# Patient Record
Sex: FEMALE | Race: WHITE | Employment: FULL TIME | URBAN - METROPOLITAN AREA
[De-identification: names, ages, dates, MRNs, and addresses within clinical notes are randomized per-mention and may not be internally consistent; named-entity substitution may affect disease eponyms.]

---

## 2017-12-23 ENCOUNTER — HOSPITAL ENCOUNTER (OUTPATIENT)
Dept: MAMMOGRAPHY | Age: 52
Discharge: HOME | End: 2017-12-23
Attending: OBSTETRICS & GYNECOLOGY
Payer: COMMERCIAL

## 2017-12-23 DIAGNOSIS — Z12.39 ENCOUNTER FOR SCREENING FOR MALIGNANT NEOPLASM OF BREAST: ICD-10-CM

## 2017-12-23 DIAGNOSIS — Z12.39 SCREENING FOR MALIGNANT NEOPLASM OF BREAST: ICD-10-CM

## 2017-12-23 PROCEDURE — 77063 BREAST TOMOSYNTHESIS BI: CPT | Performed by: OBSTETRICS & GYNECOLOGY

## 2017-12-23 PROCEDURE — 77067 SCR MAMMO BI INCL CAD: CPT | Performed by: OBSTETRICS & GYNECOLOGY

## 2018-11-14 PROCEDURE — 87624 HPV HI-RISK TYP POOLED RSLT: CPT | Performed by: OBSTETRICS & GYNECOLOGY

## 2018-11-14 PROCEDURE — 88175 CYTOPATH C/V AUTO FLUID REDO: CPT | Performed by: OBSTETRICS & GYNECOLOGY

## 2018-11-23 ENCOUNTER — HOSPITAL ENCOUNTER (OUTPATIENT)
Dept: MAMMOGRAPHY | Age: 53
Discharge: HOME | End: 2018-11-23
Attending: OBSTETRICS & GYNECOLOGY
Payer: COMMERCIAL

## 2018-11-23 ENCOUNTER — HOSPITAL ENCOUNTER (OUTPATIENT)
Dept: ULTRASOUND IMAGING | Age: 53
Discharge: HOME | End: 2018-11-23
Attending: OBSTETRICS & GYNECOLOGY
Payer: COMMERCIAL

## 2018-11-23 DIAGNOSIS — N63.20 LEFT BREAST MASS: ICD-10-CM

## 2018-11-23 PROCEDURE — 77062 BREAST TOMOSYNTHESIS BI: CPT | Performed by: OBSTETRICS & GYNECOLOGY

## 2018-11-23 PROCEDURE — 77066 DX MAMMO INCL CAD BI: CPT | Performed by: OBSTETRICS & GYNECOLOGY

## 2018-11-23 PROCEDURE — 76641 ULTRASOUND BREAST COMPLETE: CPT | Performed by: OBSTETRICS & GYNECOLOGY

## 2018-11-23 PROCEDURE — 76642 ULTRASOUND BREAST LIMITED: CPT | Performed by: OBSTETRICS & GYNECOLOGY

## 2018-11-25 NOTE — PROGRESS NOTES
Radiology has informed patient of results and recommendations for follow up. [place] [facility] [place] NEED TO [place]TACT: THEY HAVE SPOKEN TO HER.  IF DONE AT ANY OTHER FACILITY, PLEASE CONTACT TO BE SURE RECEIVED RESULTS AND F/U INSTRUCTIONS  Sent

## 2020-03-17 ENCOUNTER — HOSPITAL ENCOUNTER (OUTPATIENT)
Dept: MAMMOGRAPHY | Age: 55
Discharge: HOME | End: 2020-03-17
Attending: OBSTETRICS & GYNECOLOGY
Payer: COMMERCIAL

## 2020-03-17 DIAGNOSIS — Z12.39 SCREENING FOR MALIGNANT NEOPLASM OF BREAST: ICD-10-CM

## 2020-03-17 PROCEDURE — 77063 BREAST TOMOSYNTHESIS BI: CPT | Performed by: OBSTETRICS & GYNECOLOGY

## 2020-03-17 PROCEDURE — 77067 SCR MAMMO BI INCL CAD: CPT | Performed by: OBSTETRICS & GYNECOLOGY

## 2021-02-04 PROBLEM — I10 ESSENTIAL HYPERTENSION: Status: ACTIVE | Noted: 2021-02-04

## 2021-02-18 PROBLEM — R73.09 ELEVATED GLUCOSE: Status: ACTIVE | Noted: 2021-02-18

## 2021-02-18 PROBLEM — E78.49 OTHER HYPERLIPIDEMIA: Status: ACTIVE | Noted: 2021-02-18

## 2023-05-27 ENCOUNTER — APPOINTMENT (OUTPATIENT)
Dept: MRI IMAGING | Facility: HOSPITAL | Age: 58
DRG: 065 | End: 2023-05-27
Attending: EMERGENCY MEDICINE
Payer: COMMERCIAL

## 2023-05-27 ENCOUNTER — HOSPITAL ENCOUNTER (INPATIENT)
Facility: HOSPITAL | Age: 58
LOS: 2 days | Discharge: HOME | DRG: 065 | End: 2023-05-29
Attending: EMERGENCY MEDICINE | Admitting: STUDENT IN AN ORGANIZED HEALTH CARE EDUCATION/TRAINING PROGRAM
Payer: COMMERCIAL

## 2023-05-27 ENCOUNTER — APPOINTMENT (OUTPATIENT)
Dept: CT IMAGING | Facility: HOSPITAL | Age: 58
DRG: 065 | End: 2023-05-27
Attending: EMERGENCY MEDICINE
Payer: COMMERCIAL

## 2023-05-27 DIAGNOSIS — E86.0 DEHYDRATION: Primary | ICD-10-CM

## 2023-05-27 DIAGNOSIS — E87.6 HYPOKALEMIA: ICD-10-CM

## 2023-05-27 DIAGNOSIS — Z86.73 CEREBELLAR CEREBROVASCULAR ACCIDENT (CVA) WITHOUT LATE EFFECT: ICD-10-CM

## 2023-05-27 PROBLEM — E87.20 METABOLIC ACIDOSIS: Status: ACTIVE | Noted: 2023-05-27

## 2023-05-27 PROBLEM — R73.9 HYPERGLYCEMIA: Status: ACTIVE | Noted: 2023-05-27

## 2023-05-27 PROBLEM — I63.9 CVA (CEREBRAL VASCULAR ACCIDENT) (HCC): Status: ACTIVE | Noted: 2023-05-27

## 2023-05-27 LAB
ALBUMIN SERPL-MCNC: 3.8 G/DL (ref 3.4–5)
ALBUMIN/GLOB SERPL: 1 {RATIO} (ref 1–2)
ALP LIVER SERPL-CCNC: 90 U/L
ALT SERPL-CCNC: 22 U/L
ANION GAP SERPL CALC-SCNC: 15 MMOL/L (ref 0–18)
AST SERPL-CCNC: 22 U/L (ref 15–37)
BASOPHILS # BLD AUTO: 0.08 X10(3) UL (ref 0–0.2)
BASOPHILS NFR BLD AUTO: 0.7 %
BILIRUB SERPL-MCNC: 0.7 MG/DL (ref 0.1–2)
BILIRUB UR QL STRIP.AUTO: NEGATIVE
BUN BLD-MCNC: 21 MG/DL (ref 7–18)
CALCIUM BLD-MCNC: 9.4 MG/DL (ref 8.5–10.1)
CHLORIDE SERPL-SCNC: 103 MMOL/L (ref 98–112)
CLARITY UR REFRACT.AUTO: CLEAR
CO2 SERPL-SCNC: 18 MMOL/L (ref 21–32)
COLOR UR AUTO: YELLOW
CREAT BLD-MCNC: 1.11 MG/DL
EOSINOPHIL # BLD AUTO: 0.18 X10(3) UL (ref 0–0.7)
EOSINOPHIL NFR BLD AUTO: 1.7 %
ERYTHROCYTE [DISTWIDTH] IN BLOOD BY AUTOMATED COUNT: 11.6 %
GFR SERPLBLD BASED ON 1.73 SQ M-ARVRAT: 57 ML/MIN/1.73M2 (ref 60–?)
GLOBULIN PLAS-MCNC: 3.9 G/DL (ref 2.8–4.4)
GLUCOSE BLD-MCNC: 153 MG/DL (ref 70–99)
GLUCOSE UR STRIP.AUTO-MCNC: NEGATIVE MG/DL
HCT VFR BLD AUTO: 41.2 %
HGB BLD-MCNC: 14.6 G/DL
IMM GRANULOCYTES # BLD AUTO: 0.13 X10(3) UL (ref 0–1)
IMM GRANULOCYTES NFR BLD: 1.2 %
KETONES UR STRIP.AUTO-MCNC: 20 MG/DL
LEUKOCYTE ESTERASE UR QL STRIP.AUTO: NEGATIVE
LIPASE SERPL-CCNC: 26 U/L (ref 13–75)
LYMPHOCYTES # BLD AUTO: 3.93 X10(3) UL (ref 1–4)
LYMPHOCYTES NFR BLD AUTO: 36.4 %
MAGNESIUM SERPL-MCNC: 1.8 MG/DL (ref 1.6–2.6)
MCH RBC QN AUTO: 32.7 PG (ref 26–34)
MCHC RBC AUTO-ENTMCNC: 35.4 G/DL (ref 31–37)
MCV RBC AUTO: 92.2 FL
MONOCYTES # BLD AUTO: 0.78 X10(3) UL (ref 0.1–1)
MONOCYTES NFR BLD AUTO: 7.2 %
NEUTROPHILS # BLD AUTO: 5.7 X10 (3) UL (ref 1.5–7.7)
NEUTROPHILS # BLD AUTO: 5.7 X10(3) UL (ref 1.5–7.7)
NEUTROPHILS NFR BLD AUTO: 52.8 %
NITRITE UR QL STRIP.AUTO: NEGATIVE
OSMOLALITY SERPL CALC.SUM OF ELEC: 288 MOSM/KG (ref 275–295)
PH UR STRIP.AUTO: 7 [PH] (ref 5–8)
PLATELET # BLD AUTO: 272 10(3)UL (ref 150–450)
POTASSIUM SERPL-SCNC: 2.8 MMOL/L (ref 3.5–5.1)
PROT SERPL-MCNC: 7.7 G/DL (ref 6.4–8.2)
PROT UR STRIP.AUTO-MCNC: NEGATIVE MG/DL
RBC # BLD AUTO: 4.47 X10(6)UL
RBC UR QL AUTO: NEGATIVE
SODIUM SERPL-SCNC: 136 MMOL/L (ref 136–145)
SP GR UR STRIP.AUTO: 1.01 (ref 1–1.03)
UROBILINOGEN UR STRIP.AUTO-MCNC: 2 MG/DL
WBC # BLD AUTO: 10.8 X10(3) UL (ref 4–11)

## 2023-05-27 PROCEDURE — 70450 CT HEAD/BRAIN W/O DYE: CPT | Performed by: EMERGENCY MEDICINE

## 2023-05-27 PROCEDURE — 70553 MRI BRAIN STEM W/O & W/DYE: CPT | Performed by: EMERGENCY MEDICINE

## 2023-05-27 PROCEDURE — 70498 CT ANGIOGRAPHY NECK: CPT | Performed by: EMERGENCY MEDICINE

## 2023-05-27 PROCEDURE — 70496 CT ANGIOGRAPHY HEAD: CPT | Performed by: EMERGENCY MEDICINE

## 2023-05-27 PROCEDURE — 0042T CT STROKE(DAWN BRAIN)CTA BRAIN/CTA NECK+PERF(CPT=70496/70498/0042T): CPT | Performed by: EMERGENCY MEDICINE

## 2023-05-27 RX ORDER — POTASSIUM CHLORIDE 20 MEQ/1
40 TABLET, EXTENDED RELEASE ORAL ONCE
Status: COMPLETED | OUTPATIENT
Start: 2023-05-27 | End: 2023-05-27

## 2023-05-27 RX ORDER — ONDANSETRON 2 MG/ML
4 INJECTION INTRAMUSCULAR; INTRAVENOUS EVERY 6 HOURS PRN
Status: DISCONTINUED | OUTPATIENT
Start: 2023-05-27 | End: 2023-05-29

## 2023-05-27 RX ORDER — HEPARIN SODIUM 5000 [USP'U]/ML
5000 INJECTION, SOLUTION INTRAVENOUS; SUBCUTANEOUS EVERY 8 HOURS SCHEDULED
Status: DISCONTINUED | OUTPATIENT
Start: 2023-05-28 | End: 2023-05-29

## 2023-05-27 RX ORDER — ALBUTEROL SULFATE 90 UG/1
2 AEROSOL, METERED RESPIRATORY (INHALATION) EVERY 6 HOURS PRN
Status: DISCONTINUED | OUTPATIENT
Start: 2023-05-27 | End: 2023-05-29

## 2023-05-27 RX ORDER — GADOTERATE MEGLUMINE 376.9 MG/ML
20 INJECTION INTRAVENOUS
Status: COMPLETED | OUTPATIENT
Start: 2023-05-27 | End: 2023-05-27

## 2023-05-27 RX ORDER — ACETAMINOPHEN 325 MG/1
650 TABLET ORAL EVERY 4 HOURS PRN
Status: DISCONTINUED | OUTPATIENT
Start: 2023-05-27 | End: 2023-05-29

## 2023-05-27 RX ORDER — DIPHENHYDRAMINE HYDROCHLORIDE 50 MG/ML
25 INJECTION INTRAMUSCULAR; INTRAVENOUS ONCE
Status: COMPLETED | OUTPATIENT
Start: 2023-05-27 | End: 2023-05-27

## 2023-05-27 RX ORDER — ASPIRIN 81 MG/1
324 TABLET, CHEWABLE ORAL ONCE
Status: COMPLETED | OUTPATIENT
Start: 2023-05-27 | End: 2023-05-27

## 2023-05-27 RX ORDER — ATORVASTATIN CALCIUM 40 MG/1
40 TABLET, FILM COATED ORAL NIGHTLY
Status: DISCONTINUED | OUTPATIENT
Start: 2023-05-28 | End: 2023-05-28

## 2023-05-27 RX ORDER — ONDANSETRON 2 MG/ML
4 INJECTION INTRAMUSCULAR; INTRAVENOUS ONCE
Status: COMPLETED | OUTPATIENT
Start: 2023-05-27 | End: 2023-05-27

## 2023-05-27 RX ORDER — HYDRALAZINE HYDROCHLORIDE 20 MG/ML
10 INJECTION INTRAMUSCULAR; INTRAVENOUS EVERY 2 HOUR PRN
Status: DISCONTINUED | OUTPATIENT
Start: 2023-05-27 | End: 2023-05-29

## 2023-05-27 RX ORDER — POTASSIUM CHLORIDE 14.9 MG/ML
20 INJECTION INTRAVENOUS ONCE
Status: COMPLETED | OUTPATIENT
Start: 2023-05-27 | End: 2023-05-27

## 2023-05-27 RX ORDER — SODIUM CHLORIDE 9 MG/ML
INJECTION, SOLUTION INTRAVENOUS CONTINUOUS
Status: DISCONTINUED | OUTPATIENT
Start: 2023-05-28 | End: 2023-05-29

## 2023-05-27 RX ORDER — METOCLOPRAMIDE HYDROCHLORIDE 5 MG/ML
10 INJECTION INTRAMUSCULAR; INTRAVENOUS ONCE
Status: COMPLETED | OUTPATIENT
Start: 2023-05-27 | End: 2023-05-27

## 2023-05-27 RX ORDER — ACETAMINOPHEN 650 MG/1
650 SUPPOSITORY RECTAL EVERY 4 HOURS PRN
Status: DISCONTINUED | OUTPATIENT
Start: 2023-05-27 | End: 2023-05-29

## 2023-05-27 RX ORDER — PROCHLORPERAZINE EDISYLATE 5 MG/ML
5 INJECTION INTRAMUSCULAR; INTRAVENOUS EVERY 8 HOURS PRN
Status: DISCONTINUED | OUTPATIENT
Start: 2023-05-27 | End: 2023-05-29

## 2023-05-27 RX ORDER — DIAZEPAM 5 MG/ML
5 INJECTION, SOLUTION INTRAMUSCULAR; INTRAVENOUS ONCE
Status: COMPLETED | OUTPATIENT
Start: 2023-05-27 | End: 2023-05-27

## 2023-05-27 RX ORDER — VALACYCLOVIR HYDROCHLORIDE 500 MG/1
500 TABLET, FILM COATED ORAL EVERY 12 HOURS SCHEDULED
Status: DISCONTINUED | OUTPATIENT
Start: 2023-05-28 | End: 2023-05-29

## 2023-05-27 RX ORDER — LABETALOL HYDROCHLORIDE 5 MG/ML
10 INJECTION, SOLUTION INTRAVENOUS EVERY 10 MIN PRN
Status: DISCONTINUED | OUTPATIENT
Start: 2023-05-27 | End: 2023-05-29

## 2023-05-27 NOTE — ED INITIAL ASSESSMENT (HPI)
Pt brought in by EMS from home with c/o of vertigo for the past 30 minutes. +N/V. Pt with hx of vertigo, states \"this feels worse than usual.\" Pt taking weight loss medications.

## 2023-05-28 ENCOUNTER — APPOINTMENT (OUTPATIENT)
Dept: CV DIAGNOSTICS | Facility: HOSPITAL | Age: 58
DRG: 065 | End: 2023-05-28
Attending: STUDENT IN AN ORGANIZED HEALTH CARE EDUCATION/TRAINING PROGRAM
Payer: COMMERCIAL

## 2023-05-28 LAB
ANION GAP SERPL CALC-SCNC: 5 MMOL/L (ref 0–18)
BUN BLD-MCNC: 14 MG/DL (ref 7–18)
CALCIUM BLD-MCNC: 8.4 MG/DL (ref 8.5–10.1)
CHLORIDE SERPL-SCNC: 105 MMOL/L (ref 98–112)
CHOLEST SERPL-MCNC: 137 MG/DL (ref ?–200)
CO2 SERPL-SCNC: 25 MMOL/L (ref 21–32)
CREAT BLD-MCNC: 0.69 MG/DL
ERYTHROCYTE [DISTWIDTH] IN BLOOD BY AUTOMATED COUNT: 11.7 %
EST. AVERAGE GLUCOSE BLD GHB EST-MCNC: 114 MG/DL (ref 68–126)
GFR SERPLBLD BASED ON 1.73 SQ M-ARVRAT: 100 ML/MIN/1.73M2 (ref 60–?)
GLUCOSE BLD-MCNC: 114 MG/DL (ref 70–99)
GLUCOSE BLD-MCNC: 117 MG/DL (ref 70–99)
GLUCOSE BLD-MCNC: 135 MG/DL (ref 70–99)
GLUCOSE BLD-MCNC: 136 MG/DL (ref 70–99)
GLUCOSE BLD-MCNC: 158 MG/DL (ref 70–99)
HBA1C MFR BLD: 5.6 % (ref ?–5.7)
HCT VFR BLD AUTO: 37.7 %
HDLC SERPL-MCNC: 54 MG/DL (ref 40–59)
HGB BLD-MCNC: 13.1 G/DL
LDLC SERPL CALC-MCNC: 67 MG/DL (ref ?–100)
MCH RBC QN AUTO: 32.5 PG (ref 26–34)
MCHC RBC AUTO-ENTMCNC: 34.7 G/DL (ref 31–37)
MCV RBC AUTO: 93.5 FL
NONHDLC SERPL-MCNC: 83 MG/DL (ref ?–130)
OSMOLALITY SERPL CALC.SUM OF ELEC: 281 MOSM/KG (ref 275–295)
PLATELET # BLD AUTO: 227 10(3)UL (ref 150–450)
POTASSIUM SERPL-SCNC: 4.4 MMOL/L (ref 3.5–5.1)
RBC # BLD AUTO: 4.03 X10(6)UL
SODIUM SERPL-SCNC: 135 MMOL/L (ref 136–145)
TRIGL SERPL-MCNC: 86 MG/DL (ref 30–149)
VLDLC SERPL CALC-MCNC: 13 MG/DL (ref 0–30)
WBC # BLD AUTO: 11.5 X10(3) UL (ref 4–11)

## 2023-05-28 PROCEDURE — 93306 TTE W/DOPPLER COMPLETE: CPT | Performed by: STUDENT IN AN ORGANIZED HEALTH CARE EDUCATION/TRAINING PROGRAM

## 2023-05-28 PROCEDURE — 99223 1ST HOSP IP/OBS HIGH 75: CPT | Performed by: OTHER

## 2023-05-28 RX ORDER — CLOPIDOGREL BISULFATE 75 MG/1
75 TABLET ORAL DAILY
Status: DISCONTINUED | OUTPATIENT
Start: 2023-05-29 | End: 2023-05-29

## 2023-05-28 RX ORDER — ATORVASTATIN CALCIUM 20 MG/1
20 TABLET, FILM COATED ORAL NIGHTLY
Qty: 90 TABLET | Refills: 1 | Status: SHIPPED | OUTPATIENT
Start: 2023-05-28

## 2023-05-28 RX ORDER — ASPIRIN 81 MG/1
81 TABLET ORAL DAILY
Status: DISCONTINUED | OUTPATIENT
Start: 2023-05-29 | End: 2023-05-29

## 2023-05-28 RX ORDER — ATORVASTATIN CALCIUM 20 MG/1
20 TABLET, FILM COATED ORAL NIGHTLY
Status: DISCONTINUED | OUTPATIENT
Start: 2023-05-28 | End: 2023-05-29

## 2023-05-28 RX ORDER — CLOPIDOGREL BISULFATE 75 MG/1
75 TABLET ORAL DAILY
Qty: 20 TABLET | Refills: 0 | Status: SHIPPED | OUTPATIENT
Start: 2023-05-29

## 2023-05-28 RX ORDER — MAGNESIUM OXIDE 400 MG/1
400 TABLET ORAL ONCE
Status: COMPLETED | OUTPATIENT
Start: 2023-05-28 | End: 2023-05-28

## 2023-05-28 NOTE — PROGRESS NOTES
NURSING ADMISSION NOTE      Patient admitted via Cart  Oriented to room. Safety precautions initiated. Bed in low position. Call light in reach. A/Ox4, on room air, NSR on tele. Q2 Neuro assessment. NIH daily. Vital signs Q2. VTE prophylaxis with heparin. PASSed RN dysphagia screen. Advanced to cardiac diet. IV to right AC infusing 0.9 nacl at 75 ml/hr. No complaints of pain. Patient updated with plan of care. All needs met at this time.

## 2023-05-28 NOTE — PLAN OF CARE
Assumed care at 0730  Orientated x4, NSR, RA   Denies pain    Nq4; no new neuro deficits   BP within parameters   Per patient, \"vertigo and nausea has improved\"   PT/OT and echo completed  Needs met, call light within reach     Plan for further stroke workup; asa/plavix added

## 2023-05-28 NOTE — ED QUICK NOTES
Orders for admission, patient is aware of plan and ready to go upstairs. Any questions, please call ED RN Moon Tillman at extension [phone].      Patient Covid vaccination status: Fully vaccinated     COVID Test Ordered in ED: None    COVID Suspicion at Admission: N/A    Running Infusions:  None    Mental Status/LOC at time of transport: AO x 4    Other pertinent information:   CIWA score: N/A   NIH score:  0

## 2023-05-28 NOTE — ED QUICK NOTES
Pt vomiting, unable to complete NIH at this time. Pt unable to move her extremities at this time. Pt shaking. Pt speaking in complete/clear sentences. Pt states \"she feels dizzy and cold. \" AO x 4.

## 2023-05-28 NOTE — PROGRESS NOTES
Stroke Navigator Note:    2000: Called to C9/C9 to evaluate pt.    2003: arrived to pt cart side    Pt developed dizziness at 3:45 pm. Per pt she has history of vertigo which is like \"room spinning\" but this is like \" waves\"  Then nausea ,vomiting and diarrhea; then she reportedly got cold and diaphoretic; laid down on the floor. She was holding on the wall to walk    PMHx including DM2, HTN, and Hyperlipidemia    Pt had MRI which showed multiple cerebellar stroke    Upon arrival found patient with NIH of 0  Left leg heel to shin was slower compared to R leg. Last Known Normal at 1530  Patient does have contraindications for TNK - out of window    2015: Code stroke activated. Discussed with Dr. Ismael Malcolm patient to CT scanner for CT/CTA/CTP  2022: Discussed case with Dr. Suzy Rodarte  2048: per Dr. Suzy Rodarte pt is not a candidate for praveena intervention based on imaging result. Discussed case with Dr. Jarrett Crook MD, bedside RN, pt and spouse.     Pt to be admitted on the floor

## 2023-05-29 VITALS
WEIGHT: 176 LBS | OXYGEN SATURATION: 100 % | RESPIRATION RATE: 16 BRPM | DIASTOLIC BLOOD PRESSURE: 87 MMHG | BODY MASS INDEX: 30.05 KG/M2 | HEART RATE: 82 BPM | TEMPERATURE: 99 F | SYSTOLIC BLOOD PRESSURE: 140 MMHG | HEIGHT: 64 IN

## 2023-05-29 LAB
GLUCOSE BLD-MCNC: 96 MG/DL (ref 70–99)
MAGNESIUM SERPL-MCNC: 2.1 MG/DL (ref 1.6–2.6)

## 2023-05-29 RX ORDER — ASPIRIN 325 MG
325 TABLET ORAL DAILY
Qty: 30 TABLET | Refills: 1 | Status: SHIPPED | OUTPATIENT
Start: 2023-06-20

## 2023-05-29 RX ORDER — ASPIRIN 81 MG/1
81 TABLET ORAL DAILY
Qty: 20 TABLET | Refills: 0 | Status: SHIPPED | OUTPATIENT
Start: 2023-05-30 | End: 2023-06-19

## 2023-05-29 NOTE — PLAN OF CARE
NURSING DISCHARGE NOTE    Discharged Home via Ambulatory. Accompanied by Family member and Support staff  Belongings Taken by patient/family. Pt AxO4  RA  NSR  No pain at this time  1x assist/standby  -OT c/s    Neurochecks  -deficits noted  NIH daily  IV fluids  Monitor/manage BG level  Monitor/manage BP level  Pt informed of POC, all questions answered  -pt family informed of POC    Pt given and informed of discharge instrucitons, new medications, change medications, home medications, and f/u dates. Pt given further education handouts related to admission. Pt and family verbally understand discharge instructions, all questions answered.     Problem: Diabetes/Glucose Control  Goal: Glucose maintained within prescribed range  Description: INTERVENTIONS:  - Monitor Blood Glucose as ordered  - Assess for signs and symptoms of hyperglycemia and hypoglycemia  - Administer ordered medications to maintain glucose within target range  - Assess barriers to adequate nutritional intake and initiate nutrition consult as needed  - Instruct patient on self management of diabetes  5/29/2023 1141 by Cookie Martinez RN  Outcome: Adequate for Discharge  5/29/2023 1140 by Cookie Martinez RN  Outcome: Adequate for Discharge     Problem: Patient/Family Goals  Goal: Patient/Family Long Term Goal  Description: Patient's Long Term Goal: discharge in stable condition    Interventions:  - pain control  - stabilize blood pressure  - PT/OT/SLP  - See additional Care Plan goals for specific interventions  5/29/2023 1141 by Cookie Martinez RN  Outcome: Adequate for Discharge  5/29/2023 1140 by Cookie Martinez RN  Outcome: Adequate for Discharge  Goal: Patient/Family Short Term Goal  Description: Patient's Short Term Goal: pain control      Interventions:   - follow medications as ordered  - PRNs as needed  - See additional Care Plan goals for specific interventions  5/29/2023 1141 by Cookie Martinez RN  Outcome: Adequate for Discharge  5/29/2023 1140 by Cookie Martinez RN  Outcome: Adequate for Discharge     Problem: NEUROLOGICAL - ADULT  Goal: Achieves stable or improved neurological status  Description: INTERVENTIONS  - Assess for and report changes in neurological status  - Initiate measures to prevent increased intracranial pressure  - Maintain blood pressure and fluid volume within ordered parameters to optimize cerebral perfusion and minimize risk of hemorrhage  - Monitor temperature, glucose, and sodium.  Initiate appropriate interventions as ordered  --150  -PT/OT/SLP eval  5/29/2023 1141 by Cookie Martinez RN  Outcome: Adequate for Discharge  5/29/2023 1140 by Cookie Martinez RN  Outcome: Adequate for Discharge  Goal: Achieves maximal functionality and self care  Description: INTERVENTIONS  - Monitor swallowing and airway patency with patient fatigue and changes in neurological status  - Encourage and assist patient to increase activity and self care with guidance from PT/OT  - Encourage visually impaired, hearing impaired and aphasic patients to use assistive/communication devices  -PT/OT/SLP eval  5/29/2023 1141 by Cookie Martinez RN  Outcome: Adequate for Discharge  5/29/2023 1140 by Cookie Martinez RN  Outcome: Adequate for Discharge

## 2023-05-30 ENCOUNTER — TELEPHONE (OUTPATIENT)
Dept: SURGERY | Facility: CLINIC | Age: 58
End: 2023-05-30

## 2023-05-30 LAB
APTT PPP: 28.7 SECONDS (ref 23.3–35.6)
INR BLD: 0.98 (ref 0.85–1.16)
LA 3 SCREEN W REFLEX-IMP: NEGATIVE
PROTHROMBIN TIME: 13 SECONDS (ref 11.6–14.8)
SCREEN DRVVT: 1.01 S (ref 0–1.29)
SCREEN DRVVT: NEGATIVE S
STACLOT LA DELTA: 2.7 SECONDS (ref ?–8)

## 2023-05-30 NOTE — TELEPHONE ENCOUNTER
Pt called and stated will be on vacation 6/8/23 to 7/3/23 pt was scheduled for stroke fu on 7/5/23 for 11:40am Detail Level: Zone Detail Level: Detailed

## 2023-05-30 NOTE — TELEPHONE ENCOUNTER
Pt saw provider in [place] [id number] 5/27-5/29. She states Provider wants to see her in 30 days and 60 days for a stroke follow-up. Pt scheduled for 7/27/23. She will be out of town the week of July 4th. Please advise, Pt's best call back number is [phone]. Endorsed to RN for Provider.

## 2023-05-31 LAB
ANTITHROMBIN ACTIVITY: 99 %
B2 GLYCOPROT1 IGG SERPL IA-ACNC: <0.8 U/ML (ref ?–7)
B2 GLYCOPROT1 IGM SERPL IA-ACNC: <2.4 U/ML (ref ?–7)
CARDIOLIPIN IGG SERPL-ACNC: <0.5 GPL (ref ?–10)
CARDIOLIPIN IGM SERPL-ACNC: 3.8 MPL (ref ?–10)
FACTOR VIII ACT: 155 %
PROTEIN C FUNCTIONAL: 125 %
PROTEIN S FUNCT: 97 %

## 2023-07-05 ENCOUNTER — OFFICE VISIT (OUTPATIENT)
Dept: NEUROLOGY | Facility: CLINIC | Age: 58
End: 2023-07-05
Payer: COMMERCIAL

## 2023-07-05 VITALS
WEIGHT: 171.31 LBS | SYSTOLIC BLOOD PRESSURE: 121 MMHG | DIASTOLIC BLOOD PRESSURE: 62 MMHG | BODY MASS INDEX: 29 KG/M2 | HEART RATE: 78 BPM | RESPIRATION RATE: 15 BRPM

## 2023-07-05 DIAGNOSIS — Z86.73 HISTORY OF STROKE: Primary | ICD-10-CM

## 2023-07-05 PROCEDURE — 99213 OFFICE O/P EST LOW 20 MIN: CPT | Performed by: OTHER

## 2023-07-05 PROCEDURE — 3078F DIAST BP <80 MM HG: CPT | Performed by: OTHER

## 2023-07-05 PROCEDURE — 3074F SYST BP LT 130 MM HG: CPT | Performed by: OTHER

## 2023-07-05 RX ORDER — METFORMIN HYDROCHLORIDE 500 MG/1
1000 TABLET, EXTENDED RELEASE ORAL 2 TIMES DAILY WITH MEALS
COMMUNITY
Start: 2023-05-24

## 2023-07-05 NOTE — PROGRESS NOTES
Neurology H&P    Gabriela Briscoe Patient Status:  No patient class for patient encounter    1965 MRN [id number]   Location [facility] Attending No att. providers found   Hosp Day # 0 PCP Zully Hopper MD     Subjective:  Initial Hospital HPI 23  Gabriela Briscoe is a(n) 59year old female with a PMH significant for obestiy, HTN, HL and who presented to the ED with acute onset vertigo. She had a CTH/CTA and MRI in the ED and was found to have a larger R cerebelalr stroke. She states that she was juicing a lemon for a cake and then became very light headed. Not necessarily like a vertigo sensation but almost pulsing. She went to [employer] and eventually started vomiting. She states that since arriving [facility] ED she has felt fine. She reports no numbness, weakness or tingling. No vertigo or light headedness or headaches at this time. No previous h/o stroke and she does not take any antiplatelet at home. Interim History:  Pt as last seen in the hospital following her cerebellar stroke. She comes back to the clinic today for follow up. She has not had any new stroke like symptoms. She is taking aspirin and atorvastatin daily. Current Medications:  Current Outpatient Medications   Medication Sig Dispense Refill    metFORMIN  MG Oral Tablet 24 Hr Take 2 tablets (1,000 mg total) by mouth 2 (two) times daily with meals. aspirin 325 MG Oral Tab Take 1 tablet (325 mg total) by mouth daily. 30 tablet 1    atorvastatin 20 MG Oral Tab Take 1 tablet (20 mg total) by mouth nightly. 90 tablet 1    Albuterol Sulfate  (90 Base) MCG/ACT Inhalation Aero Soln Inhale 2 puffs into the lungs every 6 (six) hours as needed.  6.7 g 1    HYDROCHLOROTHIAZIDE 12.5 MG Oral Cap TAKE 1 CAPSULE BY MOUTH EVERY DAY 90 capsule 0    Tolterodine Tartrate ER 4 MG Oral Capsule SR 24 Hr TAKE 1 CAPSULE(4 MG) BY MOUTH DAILY 90 capsule 1    valACYclovir 500 MG Oral Tab TAKE 1 TABLET(500 MG) BY MOUTH TWICE DAILY AS NEEDED 60 tablet 5       Problem List:  Patient Active Problem List:     Obesity     S/P endometrial ablation     Essential hypertension     Other hyperlipidemia     Elevated glucose     Metabolic acidosis     Hyperglycemia     Dehydration     Hypokalemia     Cerebellar cerebrovascular accident (CVA) without late effect     CVA (cerebral vascular accident) ([facility] [address]      PMHx:  Past Medical History:   Diagnosis Date    Elevated glucose     Essential hypertension     Hyperlipidemia     Obesity        PSHx:  Past Surgical History:   Procedure Laterality Date      ,2004    x2    COLONOSCOPY,DIAGNOSTIC N/A 2016    diverticulosis    OTHER SURGICAL HISTORY      s/p endometrial ablation    TUBAL LIGATION         SocHx:  Social History     Socioeconomic History    Marital status:     Number of children: 2   Occupational History    Occupation: Manager at [employer]    Smoking status: Never    Smokeless tobacco: Never   Vaping Use    Vaping Use: Never used   Substance and Sexual Activity    Alcohol use: Yes     Alcohol/week: 2.5 - 3.3 standard drinks of alcohol     Types: 3 - 4 Cans of beer per week     Comment: 1-2 drinks a week     Drug use: No    Sexual activity: Yes     Partners: Male     Birth control/protection: Surgical       Family History:  Family History   Problem Relation Age of Onset    Cancer Father         liver    Ulcerative Colitis Father     High Cholesterol Father     Uterine Cancer Mother     Other (Osteoporosis) Mother 72           ROS:  10 point ROS completed and was negative, except for pertinent positive and negatives stated in subjective. Objective/Physical Exam:    Vital Signs:  Weight 171 lb 4.8 oz (77.7 kg), not currently breastfeeding.     Gen: Awake and in no apparent distress  HEENT: moist mucus membranes  Neck: Supple  Cardiovascular: Regular rate and rhythm, no murmur  Pulm: CTAB  GI: non-tender, normal bowel sounds  Skin: normal, dry  Extremities: No clubbing or cyanosis      Neurologic:   MENTAL STATUS: alert, ox3, normal attention, language and fund of knowledge. CRANIAL NERVES II to XII: PERRLA, no ptosis or diplopia, EOM intact, facial sensation intact, strong eye closure, face is symmetric, no dysarthria, tongue midline,  no tongue fasciculations or atrophy, strong shoulder shrug. MOTOR EXAMINATION: normal tone, no fasciculations, normal strength throughout in UEs and LEs except. SENSORY EXAMINATION:  UE: intact to light touch, pinprick intact  LE: intact to light touch, pinprick intact    COORDINATION:  No dysmetria, or intention tremors; normal ROSITA    REFLEXES: 2+ at biceps, 2+ brachioradialis, 2+ at patella, 2+ at the ankles     GAIT: normal stance, normal toe gait and heel gait, tandem well. Labs:       Imaging:  MRI Brain 5/2023  Impression   CONCLUSION:         Multiple acute infarcts that are in moderate to large in size in the right cerebellar hemisphere are noted. These are mostly in the posterior inferior cerebellar artery territory. CTA/CTP  Narrative      FINDINGS:    After administration of contrast perfusion data was acquired at the [place] [address]. The data was sent to a separate workstation were appropriate  measures were performed. The perfusion images do not extend throughout the entire cerebellar  hemispheres. This is a nondiagnostic examination. The upper cervical, petrous, cavernous, and supraclinoid internal carotid arteries are unremarkable. An anterior communicating artery is seen. The branches of the anterior cerebral and middle cerebral arteries are unremarkable. A small right posterior   communicating artery is seen along with a small left posterior communicating artery. The branches of the posterior cerebral and superior cerebellar arteries are unremarkable. The basilar artery has a normal course and caliber.   Hypoplastic right V4  segment terminates as the right posterior inferior cerebellar artery. Left posterior inferior cerebellar arteries patent as well. There is a 3-vessel aortic arch. The origins of the branch vessels appear widely patent. The bilateral subclavian arteries and innominate artery are unremarkable. The common carotid arteries are widely patent. The carotid bifurcations appear unremarkable. There is no evidence of hemodynamically significant stenosis in the carotid bulbs by NASCET criteria. The cervical internal carotid arteries are widely  patent. The vertebral arteries originate from the subclavian arteries. The origins of the vertebral arteries are patent. The cervical vertebral arteries are widely patent. The left vertebral artery is mildly dominant. Impression   CONCLUSION:       1. Hypoplastic right vertebral artery is noted. This terminates as the right posterior inferior cerebellar artery. Bilateral tibial arteries are patent. The left humeral artery is markedly dominant. Bilateral posterior inferior cerebellar artery is  appear to be patent as well. 2. Major arterial structures otherwise in the head and neck are widely patent. 3. The perfusion examination is not demonstrate any asymmetry or ischemia. However, this is nondiagnostic for evaluation of the cerebellum. This does not extend caudally enough to evaluate the region of the PICA. TTE w/ bubble  Conclusions:     1. Left ventricle: The cavity size was normal. Wall thickness was normal.      Systolic function was normal. The estimated ejection fraction was 60-65%. Features are consistent with a pseudonormal left ventricular filling      pattern, with concomitant abnormal relaxation and increased filling      pressure - grade 2 diastolic dysfunction.    2. Right ventricle: Systolic function was normal.   3. Left atrium: The left atrial volume was normal.   4. Atrial septum: Agitated saline contrast study showed no atrial level      shunt. Assessment: This is a 58 y/o female with a previous R cerebellar stroke. Her hypercoagulable panel was normal with the exception of a mildly elevated Factor VIII, I can repeat this test and if still abnormal will refer her to hematology.  She can continue aspirin and atorvastatin at this time      Plan:  R Cerebellar stroke  - Repeat factor VIII  - Continue aspirin and atorvastatin      Hero Jacobo, DO  Neurology

## 2023-07-05 NOTE — PROGRESS NOTES
Pt seen IP for stroke by Dr. Nae Oseguera. Pt reports when she is hot and fatigued she gets a heaviness on left side, denies any new stroke symptoms. Pt reports an \"uncontrolled\" tremor of left 4th finger since stroke.

## 2023-07-06 ENCOUNTER — LAB ENCOUNTER (OUTPATIENT)
Dept: LAB | Age: 58
End: 2023-07-06
Attending: Other
Payer: COMMERCIAL

## 2023-07-06 DIAGNOSIS — Z86.73 HISTORY OF STROKE: ICD-10-CM

## 2023-07-06 PROCEDURE — 85240 CLOT FACTOR VIII AHG 1 STAGE: CPT

## 2023-07-06 PROCEDURE — 36415 COLL VENOUS BLD VENIPUNCTURE: CPT

## 2023-07-08 LAB — FACTOR VIII ACT: 136 %

## 2023-08-27 ENCOUNTER — TELEPHONE (OUTPATIENT)
Dept: MEDSURG UNIT | Facility: HOSPITAL | Age: 58
End: 2023-08-27

## 2023-11-14 ENCOUNTER — OFFICE VISIT (OUTPATIENT)
Dept: NEUROLOGY | Facility: CLINIC | Age: 58
End: 2023-11-14
Payer: COMMERCIAL

## 2023-11-14 VITALS
RESPIRATION RATE: 16 BRPM | HEART RATE: 85 BPM | WEIGHT: 167.13 LBS | BODY MASS INDEX: 29 KG/M2 | SYSTOLIC BLOOD PRESSURE: 123 MMHG | DIASTOLIC BLOOD PRESSURE: 77 MMHG

## 2023-11-14 DIAGNOSIS — Z86.73 HISTORY OF STROKE: Primary | ICD-10-CM

## 2023-11-14 PROCEDURE — 3078F DIAST BP <80 MM HG: CPT | Performed by: OTHER

## 2023-11-14 PROCEDURE — 3074F SYST BP LT 130 MM HG: CPT | Performed by: OTHER

## 2023-11-14 PROCEDURE — 99213 OFFICE O/P EST LOW 20 MIN: CPT | Performed by: OTHER

## 2023-11-14 NOTE — PROGRESS NOTES
Neurology H&P    Sonia Leightonrobles Decynthiaconrad Patient Status:  No patient class for patient encounter    1965 MRN [id number]   Location [facility], [facility] Attending No att. providers found   Hosp Day # 0 PCP Cm Cagle MD     Subjective:  Initial Hospital HPI 23  Chelsea Cheung is a(n) 59year old female with a PMH significant for obestiy, HTN, HL and who presented to the ED with acute onset vertigo. She had a CTH/CTA and MRI in the ED and was found to have a larger R cerebelalr stroke. She states that she was juicing a lemon for a cake and then became very light headed. Not necessarily like a vertigo sensation but almost pulsing. She went to [employer] and eventually started vomiting. She states that since arriving [facility] ED she has felt fine. She reports no numbness, weakness or tingling. No vertigo or light headedness or headaches at this time. No previous h/o stroke and she does not take any antiplatelet at home. Interim History:  Pt as last seen in the clinic on 23. She comes back to the clinic today for follow up. She has not had any new stroke like symptoms. She is feeling well today. She is taking aspirin and a statin for secondary stroke prevention. Current Medications:  Current Outpatient Medications   Medication Sig Dispense Refill    metFORMIN  MG Oral Tablet 24 Hr Take 2 tablets (1,000 mg total) by mouth 2 (two) times daily with meals. aspirin 325 MG Oral Tab Take 1 tablet (325 mg total) by mouth daily. 30 tablet 1    atorvastatin 20 MG Oral Tab Take 1 tablet (20 mg total) by mouth nightly. 90 tablet 1    HYDROCHLOROTHIAZIDE 12.5 MG Oral Cap TAKE 1 CAPSULE BY MOUTH EVERY DAY 90 capsule 0    Tolterodine Tartrate ER 4 MG Oral Capsule SR 24 Hr TAKE 1 CAPSULE(4 MG) BY MOUTH DAILY 90 capsule 1    Albuterol Sulfate  (90 Base) MCG/ACT Inhalation Aero Soln Inhale 2 puffs into the lungs every 6 (six) hours as needed.  6.7 g 1    valACYclovir 500 MG Oral Tab TAKE 1 TABLET(500 MG) BY MOUTH TWICE DAILY AS NEEDED 60 tablet 5       Problem List:  Patient Active Problem List   Diagnosis    Obesity    S/P endometrial ablation    Essential hypertension    Other hyperlipidemia    Elevated glucose    Metabolic acidosis    Hyperglycemia    Dehydration    Hypokalemia    Cerebellar cerebrovascular accident (CVA) without late effect    History of stroke       PMHx:  Past Medical History:   Diagnosis Date    Elevated glucose     Essential hypertension     Hyperlipidemia     Obesity        PSHx:  Past Surgical History:   Procedure Laterality Date      ,2004    x2    COLONOSCOPY,DIAGNOSTIC N/A 2016    diverticulosis    OTHER SURGICAL HISTORY      s/p endometrial ablation    TUBAL LIGATION         SocHx:  Social History     Socioeconomic History    Marital status:     Number of children: 2   Occupational History    Occupation: Manager at [employer]    Smoking status: Never    Smokeless tobacco: Never   Vaping Use    Vaping Use: Never used   Substance and Sexual Activity    Alcohol use: Yes     Alcohol/week: 2.5 - 3.3 standard drinks of alcohol     Types: 3 - 4 Cans of beer per week     Comment: 1-2 drinks a week     Drug use: No    Sexual activity: Yes     Partners: Male     Birth control/protection: Surgical   Other Topics Concern    Caffeine Concern Yes     Comment: 1 diet soda daily    Exercise Yes     Comment: very active       Family History:  Family History   Problem Relation Age of Onset    Cancer Father         liver    Ulcerative Colitis Father     High Cholesterol Father     Uterine Cancer Mother     Other (Osteoporosis) Mother 72           ROS:  10 point ROS completed and was negative, except for pertinent positive and negatives stated in subjective. Objective/Physical Exam:    Vital Signs:  Blood pressure 123/77, pulse 85, resp. rate 16, weight 167 lb 1.7 oz (75.8 kg), not currently breastfeeding.     Gen: Awake and in no apparent distress  HEENT: moist mucus membranes  Neck: Supple  Cardiovascular: Regular rate and rhythm, no murmur  Pulm: CTAB  GI: non-tender, normal bowel sounds  Skin: normal, dry  Extremities: No clubbing or cyanosis      Neurologic:   MENTAL STATUS: alert, ox3, normal attention, language and fund of knowledge. CRANIAL NERVES II to XII: PERRLA, no ptosis or diplopia, EOM intact, facial sensation intact, strong eye closure, face is symmetric, no dysarthria, tongue midline,  no tongue fasciculations or atrophy, strong shoulder shrug. MOTOR EXAMINATION: normal tone, no fasciculations, normal strength throughout in UEs and LEs     SENSORY EXAMINATION:  UE: intact to light touch, pinprick intact  LE: intact to light touch, pinprick intact    COORDINATION:  No dysmetria, or intention tremors     REFLEXES: 2+ at biceps, 2+ brachioradialis, 2+ at patella, 2+ at the ankles     GAIT: normal stance, normal toe gait and heel gait, tandem well. Labs:       Imaging:  MRI Brain 5/2023  Impression   CONCLUSION:         Multiple acute infarcts that are in moderate to large in size in the right cerebellar hemisphere are noted. These are mostly in the posterior inferior cerebellar artery territory. CTA/CTP  Narrative      FINDINGS:    After administration of contrast perfusion data was acquired at the [facility]. The data was sent to a separate workstation were appropriate  measures were performed. The perfusion images do not extend throughout the entire cerebellar  hemispheres. This is a nondiagnostic examination. The upper cervical, petrous, cavernous, and supraclinoid internal carotid arteries are unremarkable. An anterior communicating artery is seen. The branches of the anterior cerebral and middle cerebral arteries are unremarkable. A small right posterior   communicating artery is seen along with a small left posterior communicating artery.   The branches of the posterior cerebral and superior cerebellar arteries are unremarkable. The basilar artery has a normal course and caliber. Hypoplastic right V4  segment terminates as the right posterior inferior cerebellar artery. Left posterior inferior cerebellar arteries patent as well. There is a 3-vessel aortic arch. The origins of the branch vessels appear widely patent. The bilateral subclavian arteries and innominate artery are unremarkable. The common carotid arteries are widely patent. The carotid bifurcations appear unremarkable. There is no evidence of hemodynamically significant stenosis in the carotid bulbs by NASCET criteria. The cervical internal carotid arteries are widely  patent. The vertebral arteries originate from the subclavian arteries. The origins of the vertebral arteries are patent. The cervical vertebral arteries are widely patent. The left vertebral artery is mildly dominant. Impression   CONCLUSION:       1. Hypoplastic right vertebral artery is noted. This terminates as the right posterior inferior cerebellar artery. Bilateral tibial arteries are patent. The left humeral artery is markedly dominant. Bilateral posterior inferior cerebellar artery is  appear to be patent as well. 2. Major arterial structures otherwise in the head and neck are widely patent. 3. The perfusion examination is not demonstrate any asymmetry or ischemia. However, this is nondiagnostic for evaluation of the cerebellum. This does not extend caudally enough to evaluate the region of the PICA. TTE w/ bubble  Conclusions:     1. Left ventricle: The cavity size was normal. Wall thickness was normal.      Systolic function was normal. The estimated ejection fraction was 60-65%.       Features are consistent with a pseudonormal left ventricular filling      pattern, with concomitant abnormal relaxation and increased filling      pressure - grade 2 diastolic dysfunction. 2. Right ventricle: Systolic function was normal.   3. Left atrium: The left atrial volume was normal.   4. Atrial septum: Agitated saline contrast study showed no atrial level      shunt. Assessment: This is a 60 y/o female with a previous R cerebellar stroke.  Her hypercoagulable panel was normal with the exception of a mildly elevated Factor VIII, wich on repeat testing was WNL      Plan:  R Cerebellar stroke  - Repeat factor VIII was normal  - Continue aspirin and atorvastatin      Wileen Bowels, DO  Neurology

## 2024-05-24 ENCOUNTER — OFFICE VISIT (OUTPATIENT)
Dept: NEUROLOGY | Facility: CLINIC | Age: 59
End: 2024-05-24

## 2024-05-24 VITALS
HEART RATE: 75 BPM | RESPIRATION RATE: 16 BRPM | SYSTOLIC BLOOD PRESSURE: 119 MMHG | WEIGHT: 163.81 LBS | DIASTOLIC BLOOD PRESSURE: 62 MMHG | BODY MASS INDEX: 28 KG/M2

## 2024-05-24 DIAGNOSIS — Z86.73 HISTORY OF STROKE: Primary | ICD-10-CM

## 2024-05-24 PROCEDURE — 3074F SYST BP LT 130 MM HG: CPT | Performed by: OTHER

## 2024-05-24 PROCEDURE — 3078F DIAST BP <80 MM HG: CPT | Performed by: OTHER

## 2024-05-24 PROCEDURE — 99213 OFFICE O/P EST LOW 20 MIN: CPT | Performed by: OTHER

## 2024-05-24 RX ORDER — ATORVASTATIN CALCIUM 20 MG/1
40 TABLET, FILM COATED ORAL NIGHTLY
Qty: 90 TABLET | Refills: 3 | Status: SHIPPED | OUTPATIENT
Start: 2024-05-24

## 2024-05-24 NOTE — PROGRESS NOTES
Neurology H&P    Nakia Shelton Patient Status:  No patient class for patient encounter    1965 MRN [id number]   Location [facility], [address] [place] [address] Attending No att. providers found   Hosp Day # 0 PCP Henrietta March MD     Subjective:  Initial Hospital HPI 23  Nakia Shelton is a(n) 57 year old female with a PMH significant for obestiy, HTN, HL and who presented to the ED with acute onset vertigo. She had a CTH/CTA and MRI in the ED and was found to have a larger R cerebelalr stroke. She states that she was juicing a lemon for a cake and then became very light headed. Not necessarily like a vertigo sensation but almost pulsing. She went to [facility] and eventually started vomiting. She states that since arriving [facility] ED she has felt fine. She reports no numbness, weakness or tingling. No vertigo or light headedness or headaches at this time. No previous h/o stroke and she does not take any antiplatelet at home.     Interim History:  Pt as last seen in the clinic on 23. She comes back to the clinic today for follow up. She has not had any new stroke like symptoms since seeing me last. No new numbness weakness or tingling or slurred speech.     Current Medications:  Current Outpatient Medications   Medication Sig Dispense Refill    metFORMIN  MG Oral Tablet 24 Hr Take 2 tablets (1,000 mg total) by mouth 2 (two) times daily with meals.      aspirin 325 MG Oral Tab Take 1 tablet (325 mg total) by mouth daily. 30 tablet 1    atorvastatin 20 MG Oral Tab Take 1 tablet (20 mg total) by mouth nightly. 90 tablet 1    HYDROCHLOROTHIAZIDE 12.5 MG Oral Cap TAKE 1 CAPSULE BY MOUTH EVERY DAY 90 capsule 0    Tolterodine Tartrate ER 4 MG Oral Capsule SR 24 Hr TAKE 1 CAPSULE(4 MG) BY MOUTH DAILY 90 capsule 1    Albuterol Sulfate  (90 Base) MCG/ACT Inhalation Aero Soln Inhale 2 puffs into the lungs every 6 (six) hours as needed. 6.7 g 1    valACYclovir 500 MG Oral Tab  TAKE 1 TABLET(500 MG) BY MOUTH TWICE DAILY AS NEEDED 60 tablet 5       Problem List:  Patient Active Problem List   Diagnosis    Obesity    S/P endometrial ablation    Essential hypertension    Other hyperlipidemia    Elevated glucose    Metabolic acidosis    Hyperglycemia    Dehydration    Hypokalemia    Cerebellar cerebrovascular accident (CVA) without late effect    History of stroke       PMHx:  Past Medical History:    Elevated glucose    Essential hypertension    Hyperlipidemia    Obesity       PSHx:  Past Surgical History:   Procedure Laterality Date      ,2004    x2    Colonoscopy,diagnostic N/A 2016    diverticulosis    Other surgical history      s/p endometrial ablation    Tubal ligation         SocHx:  Social History     Socioeconomic History    Marital status:     Number of children: 2   Occupational History    Occupation: Manager at [employer]   Tobacco Use    Smoking status: Never    Smokeless tobacco: Never   Vaping Use    Vaping status: Never Used   Substance and Sexual Activity    Alcohol use: Yes     Alcohol/week: 2.5 - 3.3 standard drinks of alcohol     Types: 3 - 4 Cans of beer per week     Comment: 1-2 drinks a week     Drug use: No    Sexual activity: Yes     Partners: Male     Birth control/protection: Surgical   Other Topics Concern    Caffeine Concern Yes     Comment: 1 diet soda daily    Exercise Yes     Comment: very active     Social Determinants of Health      [facility]    Social Connections    [facility]    Housing Stability       Family History:  Family History   Problem Relation Age of Onset    Cancer Father         liver    Ulcerative Colitis Father     High Cholesterol Father     Uterine Cancer Mother     Other (Osteoporosis) Mother 65           ROS:  10 point ROS completed and was negative, except for pertinent  positive and negatives stated in subjective.    Objective/Physical Exam:    Vital Signs:  Blood pressure 119/62, pulse 75, resp. rate 16, weight 163 lb 12.8 oz (74.3 kg), not currently breastfeeding.    Gen: Awake and in no apparent distress  HEENT: moist mucus membranes  Neck: Supple  Cardiovascular: Regular rate and rhythm, no murmur  Pulm: CTAB  GI: non-tender, normal bowel sounds  Skin: normal, dry  Extremities: No clubbing or cyanosis      Neurologic:   MENTAL STATUS: alert, ox3, normal attention, language and fund of knowledge.      CRANIAL NERVES II to XII: PERRLA, no ptosis or diplopia, EOM intact, facial sensation intact, strong eye closure, face is symmetric, no dysarthria, tongue midline,  no tongue fasciculations or atrophy, strong shoulder shrug.    MOTOR EXAMINATION: normal tone, no fasciculations, normal strength throughout in UEs and LEs     SENSORY EXAMINATION:  UE: intact to light touch, pinprick intact  LE: intact to light touch, pinprick intact    COORDINATION:  No dysmetria, or intention tremors     REFLEXES: 2+ at biceps, 2+ brachioradialis, 2+ at patella, 2+ at the ankles     GAIT: normal stance, normal toe gait and heel gait, tandem well.             Labs:       Imaging:  MRI Brain 5/2023  Impression   CONCLUSION:         Multiple acute infarcts that are in moderate to large in size in the right cerebellar hemisphere are noted.  These are mostly in the posterior inferior cerebellar artery territory.            CTA/CTP  Narrative      FINDINGS:    After administration of contrast perfusion data was acquired at the CT scanner.  The data was sent to a separate workstation were appropriate  measures were performed.  The perfusion images do not extend throughout the entire cerebellar  hemispheres.  This is a nondiagnostic examination.     The upper cervical, petrous, cavernous, and supraclinoid internal carotid arteries are unremarkable.  An anterior communicating artery is seen.   The branches of the anterior cerebral and middle cerebral arteries are unremarkable.  A small right posterior   communicating artery is seen along with a small left posterior communicating artery.  The branches of the posterior cerebral and superior cerebellar arteries are unremarkable.  The basilar artery has a normal course and caliber.  Hypoplastic right V4  segment terminates as the right posterior inferior cerebellar artery.  Left posterior inferior cerebellar arteries patent as well.     There is a 3-vessel aortic arch.  The origins of the branch vessels appear widely patent.  The bilateral subclavian arteries and innominate artery are unremarkable.     The common carotid arteries are widely patent.  The carotid bifurcations appear unremarkable.  There is no evidence of hemodynamically significant stenosis in the carotid bulbs by NASCET criteria.  The cervical internal carotid arteries are widely  patent.     The vertebral arteries originate from the subclavian arteries.  The origins of the vertebral arteries are patent.  The cervical vertebral arteries are widely patent.  The left vertebral artery is mildly dominant.                      Impression   CONCLUSION:       1. Hypoplastic right vertebral artery is noted.  This terminates as the right posterior inferior cerebellar artery.  Bilateral tibial arteries are patent.  The left humeral artery is markedly dominant.  Bilateral posterior inferior cerebellar artery is  appear to be patent as well.     2. Major arterial structures otherwise in the head and neck are widely patent.     3. The perfusion examination is not demonstrate any asymmetry or ischemia.  However, this is nondiagnostic for evaluation of the cerebellum.  This does not extend caudally enough to evaluate the region of the PICA.         TTE w/ bubble  Conclusions:     1. Left ventricle: The cavity size was normal. Wall thickness was normal.      Systolic function was normal. The estimated  ejection fraction was 60-65%.      Features are consistent with a pseudonormal left ventricular filling      pattern, with concomitant abnormal relaxation and increased filling      pressure - grade 2 diastolic dysfunction.   2. Right ventricle: Systolic function was normal.   3. Left atrium: The left atrial volume was normal.   4. Atrial septum: Agitated saline contrast study showed no atrial level      shunt.     Assessment:  This is a 59 y/o female with a h/o a R cerebellar stroke.  She should continue aspirin and atorvastatin for secondary stroke prevention. LDL goal <70. Her LDL is 84 so we may want to increase this dose to 40mg QHS      Plan:  R Cerebellar stroke  - Continue aspirin and atorvastatin    Stroke Prevention  - Limit EtOH to no more than 2 drinks per day for men and 1 for women  - Follow  A mediterranean diet  - Abstain from tobacco products  - BP goals <140/90 optimally normotensive 120/80. Use ACEI if possible  - LDL goal < 70      RTC in 1 year if needed        Vinayak Del Rosario, DO  Neurology

## 2024-05-24 NOTE — PATIENT INSTRUCTIONS
After your visit at the [facility] today,  please direct any follow up questions or medication needs to the staff in our [place] office so that your concerns may be promptly addressed.  We are available through [employer] or at the numbers below:    The phone number is:   [phone] option #1    The fax number is:  [phone]    Your pharmacy should also send any requests electronically to the [place] office.  Refill policies:    Allow 2-3 business days for refills; controlled substances may take longer.  Contact your pharmacy at least 5 days prior to running out of medication and have them send an electronic request or submit request through the “request refill” option in your [employer] account.  Refills are not addressed on weekends; covering physicians do not authorize routine medications on weekends.  No narcotics or controlled substances are refilled after noon on Fridays or by on call physicians.  By law, narcotics must be electronically prescribed.  A 30 day supply with no refills is the maximum allowed.  If your prescription is due for a refill, you may be due for a follow up appointment.  To best provide you care, patients receiving routine medications need to be seen at least once a year.  Patients receiving narcotic/controlled substance medications need to be seen at least once every 3 months.  In the event that your preferred pharmacy does not have the requested medication in stock (e.g. Backordered), it is your responsibility to find another pharmacy that has the requested medication available.  We will gladly send a new prescription to that pharmacy at your request.    Scheduling Tests:    If your physician has ordered radiology tests such as MRI or CT scans, please contact Central Scheduling at [phone] right away to schedule the test.  Once scheduled, the [facility] Centralized Referral Team will work with your insurance carrier to obtain pre-certification or prior authorization.   Depending on your insurance carrier, approval may take 3-10 days.  It is highly recommended patients assure they have received an authorization before having a test performed.  If test is done without insurance authorization, patient may be responsible for the entire amount billed.      Precertification and Prior Authorizations:  If your physician has recommended that you have a procedure or additional testing performed the [facility] Centralized Referral Team will contact your insurance carrier to obtain pre-certification or prior authorization.    You are strongly encouraged to contact your insurance carrier to verify that your procedure/test has been approved and is a COVERED benefit.  Although the [facility] Centralized Referral Team does its due diligence, the insurance carrier gives the disclaimer that \"Although the procedure is authorized, this does not guarantee payment.\"    Ultimately the patient is responsible for payment.   Thank you for your understanding in this matter.  Paperwork Completion:  If you require FMLA or disability paperwork for your recovery, please make sure to either drop it off or have it faxed to our office at [phone]. Be sure the form has your name and date of birth on it.  The form will be faxed to our Forms Department and they will complete it for you.  There is a 25$ fee for all forms that need to be filled out.  Please be aware there is a 10-14 day turnaround time.  You will need to sign a release of information (AMADA) form if your paperwork does not come with one.  You may call the Forms Department with any questions at [phone].  Their fax number is [phone].

## 2024-10-28 RX ORDER — ATORVASTATIN CALCIUM 20 MG/1
40 TABLET, FILM COATED ORAL NIGHTLY
Qty: 60 TABLET | Refills: 0 | Status: SHIPPED | OUTPATIENT
Start: 2024-10-28

## 2024-10-28 NOTE — TELEPHONE ENCOUNTER
Medication: atorvastatin 20 MG Oral Tab      Date of last refill: 05/24/2024 (#90/0)  [place]     Last office visit: 11/14/2023  Due back to clinic per last office note:  not indicated   Date next office visit scheduled:    No future appointments.        Last OV note recommendation:    Assessment:  This is a 58 y/o female with a previous R cerebellar stroke. Her hypercoagulable panel was normal with the exception of a mildly elevated Factor VIII, wich on repeat testing was WNL        Plan:  R Cerebellar stroke  - Repeat factor VIII was normal  - Continue aspirin and atorvastatin        Vinayak Del Rosario, DO  Neurology